# Patient Record
(demographics unavailable — no encounter records)

---

## 2025-06-13 NOTE — ASSESSMENT
[FreeTextEntry1] : Chronic obesity: Patient actually has a fairly healthy dietary pattern, not too far off a typical Mediterranean diet.  She has a broad palate and likes healthy foods.  Her main issue is the amount that she is eating/portion size.  The GLP drugs are likely the most effective and safe medication option to assist her in meeting her goals.  I would recommend Zepbound 2.5 mg weekly with follow-up between the 3rd and 4th dose.  I am going to send the prescription as soon as we get an okay from the  that she does not have MEN 2.     Discussed mechanism of action, risks, benefits, and side effects of drugs including, but not limited to:   Nausea, vomiting, diarrhea, vomiting, constipation. No personal history of medullary thyroid cancer or pancreatitis.   Reports of gallbladder disease, which is common in patients who lose significant weight.   Hypersensitivity reactions can occur in 2-5% of patients. Dizziness and fatigue occur in 2-7% of patients.   Discussed need for regular exercise, specifically weight bearing exercise, to mitigate muscle loss.   Discussed need for healthy nutrition. Caloric intake will decrease because of decreased portion sizes, so proper nutrition is all the more important to avoid malnutrition in the face of decreased appetite.   Discussed titration schedule of drug. Explained some patients require titration up 1-2 doses before seeing any effect. Others have notable decreased appetite on the first dose.   Reviewed these drugs are simply a tool to aid in weight loss. Healthy nutrition, portion control, exercise, adequate sleep, stress reduction, avoidance/moderation of risky substances, etc. are all still larson to achieve optimal health.  Spent time reviewing principles of healthy nutrition/healthy dietary patterns. Specifically reviewed lean proteins (skinless poultry, fish, beans, whole grains), low saturated fat options (olive oil, avocado oil, nuts/seeds as opposed to animal fats and tropical oils), whole food carbohydrates (fresh fruit, vegetables, whole grains, beans/legumes) over refined carbohydrates (white flour, white rice, juices, foods with added sugar, etc.), importance of adequate fiber (25-28 g per day for women/35 g per day for men) and risks of low fiber diet. Gave patient copy of Nutrition basics and Mediterranean Diet handout. Also reviewed physical activity, NEAT, and exercise guidelines. Also reviewed [employer] Eating Plate.   The patient agreed to the following action plan and was given written instructions:  Zepbound - we would start at 2.5 mg   Message me (portal or call) as soon as you have the information.   Physical Activity  Continue with the pilates once a week  Fridays afternoon Gold medal bodies - looking to involve upper body, core, and lower body  Sundays  Round trip to the bottom of the [place]    Nutrition  Incorporate more meals where beans are the star protein  Recipes given Limit Halloween candy to 1 piece three times a week    Total time spent reviewing records, seeing patient, generating action plan, documenting visit 80 minutes.

## 2025-06-13 NOTE — SOCIAL HISTORY
[TextEntry] : She lives with her .  She has a daughter.  She works in information architecture. Former tobacco-11 pack years per her primary care's note. Alcohol: She really does not drink at all anymore as she does not feel well when she does.

## 2025-06-13 NOTE — HISTORY OF PRESENT ILLNESS
[FreeTextEntry1] : 61-year-old female presents to establish care for weight management.  Patient has been struggling with her weight for the last 13 years or so.  She gained weight with pregnancy, but she was able to lose it.  She had a depressive episode in 2004 and was on duloxetine and had gained a bit but she was able to lose it again.  In 2012 she had another depressive episode and went on duloxetine and that, combined with menopause, caused her to gain about 6 pounds a year for the last 13 years.  She has attempted to lose weight restricting calories and exercising gently -- no fad diets or extreme diets.  She just has been having a lot of trouble having a medically meaningful amount of weight loss that she can sustain.    She did use [facility] a couple of years ago and they put her on Contrave and metformin.  She did not really feel like either worked that well for her and she is on neither of them at this time.  Phentermine is contraindicated given her history of anxiety and ADHD.  She is on Adderall.  She was initially opposed to the GLP drugs, but now she is more educated about them, and she is wanting to discuss them as an option.  She does have a pending genetic consultation to rule out MEN 2.  Her mother and uncle had some nonmalignant parathyroid lesions and their grandfather had a mysterious element that had something to do with stomach cancer and B12 deficiency.  I explained to her that I would need to have that diagnosis ruled out before I prescribe the medicine, but I can certainly educate her about the medicine in the meantime.  We can also set lifestyle goals in the meantime.  Physical activity: Patient has been working with a physical therapist who also has a [employer] studio.  She does a 2 person class once a week.  That person has also been teaching her some body weight stuff that she intends to start doing on her own.  In the past she used to walk quite a bit.  She has around near her home that involves a very big hill.  She has not been doing that recently however.  Soon she will be going into the office 3 times a week which always increases her nonexercise activity as well.  Nutrition: 24-hour recall Breakfast: Homemade scone, iced coffee with 2 teaspoons of half-and-half An egg and cheese with a whole-wheat wrap Lunch: Salad bar with spicy chicken breast, quarter cup macaroni salad, tomato, carrots, electrolyte drinks sweetened with monk fruit.  Usually she drinks seltzer Snack: Crunch master cracker with 1 and half ounces of hard cheese Dinner: Hungry root rated Suresh with chicken sausage and a vodka sauce and a tossed salad with olive oil, salt, pepper, oregano and vinegar. Grapefruit  Fruit: Patient likes most fruits has 1 to less than 1/day Vegetables: She likes all kinds and has 2 to 3/day Beans: She loves them.  Has 2 to 3/week Fish: She likes salmon, shrimp, tuna and has once or twice a week Whole grains: She does have Analisa bread, whole-wheat wrap, daily harvest oats, and she said she likes whole grains.  Not all of her grains or whole all the time however. Daily: Not a lot of milk and when she does it is 2%.  Occasional low-fat vanilla yogurt.  She likes Guillaume.  She has cheese, but she does not take it to excess Animal protein: She eats quite a bit of chicken, beef maybe once a week, occasional pork products

## 2025-07-02 NOTE — DISCUSSION/SUMMARY
[FreeTextEntry1] : REASON FOR CONSULT  Glo Myrick is a 61-year-old female who was referred by Dr. Charu Duncan for cancer genetic counseling and risk assessment due to her family history of cancer.    RELEVANT MEDICAL HISTORY   FAMILY HISTORY:  Ms. Myrick is a healthy individual who has never had cancer. She has a family history of cancer, see below. Of note, she reports hyperparathyroidism and parathyroidectomy in both her mother and maternal uncle. Ms. Myrick recalls her mother had a diagnosis of multiple endocrine neoplasia (MEN) documented in her medical chart however it is unknown whether genetic testing was ever done.  Ms. Myrick is currently waiting to start weight loss medication with GLP drugs however her weight  (Dr. Singh) has requested she undergo genetic testing to exclude MEN2 prior to prescribing the medication.     OTHER MEDICAL AND SURGICAL HISTORY:  ADHD. Mild hypertension. Myomectomy for uterine fibroids. Bilateral salpingectomy.  Endometrial polyp. Foot surgery.     PAST OB/GYN HISTORY:  Obstetrical History:   Age at Menarche: 13  Menopausal with LMP at age 47   Age at First Live Birth: 32  Oral Contraceptive Use: Yes, (6 years)  Hormone Replacement Therapy: Yes, current, (vaginal estrogen for 1 year)    CANCER SCREENING HISTORY:    Breast: Last mammogram and US reported wnl in . Annual CBEs. No previous breast bx or breast MRI.  GYN: Last pelvic exam ~2 years ago, reported wnl. Follows up every year when she can. Colon: Last colonoscopy reported wnl 6 years ago, 2 small polyps removed, and benign pathology reported.   Skin: Last FBSE reported over 5 years. No skin biopsies.      SOCIAL HISTORY:  -	 -	Tobacco-product use: Yes, former, (smoked  pack per day for 10 years, quit 35 years ago)    FAMILY HISTORY:  Maternal ancestry was reported as [place], [place]ali and [place]ian and paternal ancestry was reported as [place] and [place]. There is Ashkenazi [place] ancestry on both maternal and paternal sides of the family. A detailed family history of cancer was ascertained. Relevant diagnoses are detailed below and in the scanned pedigree.     To Ms. Myrick's knowledge, no one in the family has had germline testing for cancer susceptibility.      RISK ASSESSMENT:  Given Ms. Myrick's reported family history of possible MEN diagnosis in her mother and maternal uncle, and the request from Ms. Myrick's weight loss specialist, genetic testing was discussed and offered today. Ms. Myrick is aware of potential out of pocket costs for genetic testing. Ms. Myrick elected to proceed with [facility]'s [facility] cancer panel. This test analyzes 4 genes: BRCA1, BRCA2, MEN1, RET.    We discussed the risks, benefits and limitations, and implications of genetic testing. We also discussed the psychosocial implications of genetic testing. Possible test results were reviewed with Ms. Myrick, along with associated medical management options. The Genetic Information Non-discrimination Act (MARIAJOSE) was also reviewed.     Ms. Myrick consented to the above-mentioned genetic testing panel. Blood was drawn in our laboratory and sent to [facility] today.    PLAN:  1.	Blood drawn today will be sent to [facility] for analysis.   2.	We will contact Ms. Myrick once the results are available and will schedule a follow-up appointment, as needed. Results generally return in 2-3 weeks from the day the sample is received in the lab.    For any additional questions please call Cancer Genetics at [phone].       Erinn Ball, MSc, [facility]  Genetic Counselor, Cancer Genetics    CC:   Dr. Charu Singh

## 2025-07-24 NOTE — DISCUSSION/SUMMARY
[FreeTextEntry1] : RESULTS TRANSMISSION  Glo Myrick is a 61-year-old female who was called 7/18/2025 for a discussion regarding her genetic testing results related to hereditary cancer predisposition.    Ms. Myrick was originally seen at [facility] on 7/2/2025 for hereditary cancer predisposition risk assessment due to her family history of cancer. Ms. Myrick reports an unconfirmed family history of MEN (Multiple Endocrine Neoplasia) and reports she was advised to undergo genetic testing to exclude MEN2 prior to starting GLP-1 agonists for weight loss. Ms. Myrick elected genetic testing using [facility] Cancer panel offered by [facility].    TEST RESULTS: NEGATIVE  No pathogenic (disease-causing) variants or VUSs were detected in the following genes: BRCA1, BRCA2, MEN1 and RET.    RESULTS INTERPRETATION AND ASSESSMENT:  Given Ms. Myrick's current reported family history of cancer, and her negative genetic test results, we discussed that in the absence of other indications, Ms. Myrick should practice age-appropriate cancer screening of other organ systems as recommended for the general population.    We cannot see any contraindication for the use of GLP-1 agonists based on Ms. Myrick's genetic results. We encouraged Ms. Myrick to speak with her weight loss management team about the negative genetic test results, and we emphasized that GLP-1 agonist therapy would be at the discretion of her prescribing provider  We also discussed that, while no high penetrance genetic susceptibility was identified in the genes tested, this result, while reassuring, does not entirely rule out a hereditary cancer risk in the patient. It is possible, although unlikely, the patient has a mutation in one of the genes tested that is not detectable by this analysis, or has a mutation in a different gene, either known or unknown. It is also possible there is a hereditary cancer predisposition in the family, but the patient did not inherit it.    We informed Ms. Myrick that our knowledge of genetics and inherited cancer conditions is changing rapidly. Therefore, we recommended that Ms. Myrick contact our office, every 2 to 3 years, to discuss relevant advances in cancer genetics.  We emphasized the importance of re-contacting us with updates regarding her personal and family history of cancer as well as any updates regarding additional cancer genetic test results performed for the patient and/or family members.  Such updates could possibly change our risk assessment and recommendations.     In addition, we discussed Ms. Myrick's mother could consider pursuing cancer risk assessment genetic counseling with the option of genetic testing.     PLAN:  1.	See above for recommended screening and risk-reduction strategies.  2.	Patient informed consult note(s) will be available through their [email or web address] patient portal and genetic test results will be released via [facility] laboratory portal.   3.	Ms. Myrick was encouraged to contact us every 2-3 years to discuss relevant advances in cancer genetics, or sooner if there are any changes in her personal or family history of cancer.    For any additional questions please call Cancer Genetics at [phone].       Erinn Ball, MSc, [facility]  Genetic Counselor, Cancer Genetics    CC:   Patient  Dr. Charu Duncan

## 2025-07-28 NOTE — HISTORY OF PRESENT ILLNESS
[FreeTextEntry1] : 61-year-old female presents for weight management follow-up.  She was interested in starting a GLP medication, but we had to officially rule out men 2.  She did and I reviewed that note.  Patient has no contraindication from a  standpoint for starting the medication.  Patient has been consistent with Pilates once a week.  She has been less consistent with the gold metal bodies weights or walking.  Patient recently came back from [place] and gained a few pounds.  She has been more consistent with limiting processed meat, increasing plant protein, limiting sugar overall.  24-hour recall Cinnamon raisin bread with a little bit of whipped cream Hard Caesar salad with shrimp New potato hash with kale, olive oil, tomatoes and a poached egg with a little bit of gruyere top Patient just got back from vacation

## 2025-07-28 NOTE — ASSESSMENT
[FreeTextEntry1] : Chronic obesity with BMI 38: Patient does not have any contraindications to starting GLP therapy.  I have written for Zepbound 2.5 mg weekly and she will follow-up with me in the beginning of September.  I reviewed with her the common side effects and how to combat them.  I did give her a prescription for ondansetron to have as needed for nausea should that occur.  He states she is susceptible to nausea in hormonal cases, such as pregnancy.  Patient will make a concerted effort to get the second day of strength training in this month.  She is already good about the Pilates, but she will really try to do the gold metal bodies 1 day/week  She will continue to focus on 80 g of protein a day minimum, 25 to 28 g of fiber.  She is already consistently trying to keep her proteins lean and the added sugar low.